# Patient Record
Sex: FEMALE | Race: WHITE | Employment: OTHER | URBAN - METROPOLITAN AREA
[De-identification: names, ages, dates, MRNs, and addresses within clinical notes are randomized per-mention and may not be internally consistent; named-entity substitution may affect disease eponyms.]

---

## 2017-02-24 ENCOUNTER — TELEPHONE (OUTPATIENT)
Dept: FAMILY MEDICINE CLINIC | Age: 47
End: 2017-02-24

## 2017-02-24 NOTE — TELEPHONE ENCOUNTER
----- Message from Cliff Palacios sent at 2/23/2017  5:20 PM EST -----  Regarding: Dr. Jed Robertson   Pt would like a call back in regards to having to re establish as a pt. Pt would like to speak to the doctor directly. Pt best contact number is [phone] or [phone].

## 2017-03-01 ENCOUNTER — OFFICE VISIT (OUTPATIENT)
Dept: FAMILY MEDICINE CLINIC | Age: 47
End: 2017-03-01

## 2017-03-01 VITALS
HEIGHT: 67 IN | DIASTOLIC BLOOD PRESSURE: 87 MMHG | RESPIRATION RATE: 20 BRPM | HEART RATE: 88 BPM | SYSTOLIC BLOOD PRESSURE: 126 MMHG | WEIGHT: 193 LBS | BODY MASS INDEX: 30.29 KG/M2 | OXYGEN SATURATION: 96 % | TEMPERATURE: 98.4 F

## 2017-03-01 DIAGNOSIS — Z23 ENCOUNTER FOR IMMUNIZATION: ICD-10-CM

## 2017-03-01 DIAGNOSIS — J06.9 UPPER RESPIRATORY TRACT INFECTION, UNSPECIFIED TYPE: Primary | ICD-10-CM

## 2017-03-01 DIAGNOSIS — Z91.09 ENVIRONMENTAL ALLERGIES: ICD-10-CM

## 2017-03-01 RX ORDER — AZITHROMYCIN 250 MG/1
TABLET, FILM COATED ORAL
Qty: 6 TAB | Refills: 1 | Status: SHIPPED | OUTPATIENT
Start: 2017-03-01 | End: 2017-03-06

## 2017-03-01 NOTE — MR AVS SNAPSHOT
Visit Information Date & Time Provider Department Dept. Phone Encounter #  
 3/1/2017  2:00 PM García Viera MD [facility] [phone] [id number] Upcoming Health Maintenance Date Due Pneumococcal 19-64 Medium Risk (1 of 1 - PPSV23) 11/7/1987 PAP AKA CERVICAL CYTOLOGY 11/1/2014 INFLUENZA AGE 9 TO ADULT 8/1/2016 DTaP/Tdap/Td series (2 - Td) 2/27/2022 Allergies as of 3/1/2017  Review Complete On: 3/1/2017 By: García Viera MD  
  
 Severity Noted Reaction Type Reactions Morphine  04/16/2013    Itching Current Immunizations  Reviewed on 3/1/2017 Name Date Influenza Vaccine 10/11/2012 TD Vaccine 7/6/2006 TDAP Vaccine 2/27/2012 Reviewed by Sofy Amador on 3/1/2017 at  2:23 PM  
You Were Diagnosed With   
  
 Codes Comments Environmental allergies    -  Primary ICD-10-CM: Z91.09 
ICD-9-CM: V15.09 Upper respiratory tract infection, unspecified type     ICD-10-CM: J06.9 ICD-9-CM: 465.9 Vitals BP  
  
  
  
  
  
 126/87 BMI and BSA Data Body Mass Index Body Surface Area  
 30.23 kg/m 2 2.03 m 2 Preferred Pharmacy Pharmacy Name Phone [facility] [address] [phone] Your Updated Medication List  
  
   
This list is accurate as of: 3/1/17  2:39 PM.  Always use your most recent med list.  
  
  
  
  
 azithromycin 250 mg tablet Commonly known as:  Sinan Del Valle Take 2 tablets today, then take 1 tablet daily  
  
 fluticasone 50 mcg/actuation nasal spray Commonly known as:  Marleen Jamison 2 Sprays by Both Nostrils route daily. Administer to right and left nostril. ZyrTEC 10 mg Cap Generic drug:  Cetirizine Take  by mouth. Prescriptions Sent to Pharmacy Refills  
 azithromycin (ZITHROMAX) 250 mg tablet 1 Sig: Take 2 tablets today, then take 1 tablet daily  Class: Normal  
 Pharmacy: [place], [place] - [address] [address] [facility] #: [phone] Patient Instructions Gargle with warm salt water Take:   over the counter tylenol as directed for pain or fever Use OTC Mucinex for cough Try Zyrtec 10 mg daily for allergy symptoms. Consider over the counter nasacort steroid nasal spray Consider trying the \"NetiPot\" which is a salt water nasal flush if you have nasal congestion:  It really helps! Make sure you use distilled water to make the saline solution Introducing [facility] SERVICES! Angela Wtats introduces [email or web address] patient portal. Now you can access parts of your medical record, email your doctor's office, and request medication refills online. 1. In your internet browser, go to [email or web address]. [email or web address] 2. Click on the First Time User? Click Here link in the Sign In box. You will see the New Member Sign Up page. 3. Enter your [email or web address] Access Code exactly as it appears below. You will not need to use this code after youve completed the sign-up process. If you do not sign up before the expiration date, you must request a new code. · [email or web address] Access Code: [email or web address] Expires: 5/30/2017  2:39 PM 
 
4. Enter the last four digits of your Social Security Number (xxxx) and Date of Birth (mm/dd/yyyy) as indicated and click Submit. You will be taken to the next sign-up page. 5. Create a [email or web address] ID. This will be your [email or web address] login ID and cannot be changed, so think of one that is secure and easy to remember. 6. Create a [email or web address] password. You can change your password at any time. 7. Enter your Password Reset Question and Answer. This can be used at a later time if you forget your password. 8. Enter your e-mail address. You will receive e-mail notification when new information is available in [address] 9. Click Sign Up. You can now view and download portions of your medical record. 10. Click the Download Summary menu link to download a portable copy of your medical information. If you have questions, please visit the Frequently Asked Questions section of the [employer] website. Remember, [employer] is NOT to be used for urgent needs. For medical emergencies, dial 911. Now available from your iPhone and Android! Please provide this summary of care documentation to your next provider. Your primary care clinician is listed as Landen Rod. If you have any questions after today's visit, please call [phone].

## 2017-03-01 NOTE — LETTER
3/1/2017 2:41 PM 
 
Ms. Alissa Garcia [address] [address] Focus on regular exercise (150 minutes each week) and healthy eating. Eat more fruits and vegetables. Eat more protein (egg whites, beans, and nuts you know you tolerate) and less carbohydrates (white bread, white rice, white pasta, white potatoes, sodas, and sweets). Eat appropriately small portion sizes. Keep up with female exams Sincerely, Joy Godinez MD

## 2017-03-01 NOTE — PROGRESS NOTES
Chief Complaint   Patient presents with    Pressure Behind the Eyes     x 1 week; subjective \"low grade\" temp    Headache     Reviewed record in preparation for visit and have obtained necessary documentation. 1. Have you been to the ER, urgent care clinic since your last visit? Hospitalized since your last visit? No    2. Have you seen or consulted any other health care providers outside of the [address] [place] [address] since your last visit? Include any pap smears or colon screening.  No

## 2017-03-01 NOTE — PROGRESS NOTES
April S Genia Dooley is a 48 y.o. female      Issues discussed today include:      Signs and symptoms:  Cough producing yellow sputum  Duration: one week  Context:  +exposures  Location:  Head and chest  Quality:  congestion  Severity:  Preventing rest  Timing:  now  Modifying factors:  No fevers. Was started on amox at [facility]      Data reviewed or ordered today:  Need pneumovax    Other problems include:  Patient Active Problem List   Diagnosis Code    Migraine G43.909    History of normal resting EKG [id number]    Overweight(278.02)     Smoker F17.200       Medications:  Current Outpatient Prescriptions   Medication Sig Dispense Refill    azithromycin (ZITHROMAX) 250 mg tablet Take 2 tablets today, then take 1 tablet daily 6 Tab 1    fluticasone (FLONASE) 50 mcg/actuation nasal spray 2 Sprays by Both Nostrils route daily. Administer to right and left nostril. 1 Bottle 2    Cetirizine (ZYRTEC) 10 mg cap Take  by mouth. Allergies: Allergies   Allergen Reactions    Morphine Itching       LMP:  No LMP recorded. Patient is not currently having periods (Reason: Menopause). Social History     Social History    Marital status:      Spouse name: N/A    Number of children: N/A    Years of education: N/A     Occupational History    Not on file. Social History Main Topics    Smoking status: Former Smoker     Packs/day: 0.25    Smokeless tobacco: Never Used    Alcohol use No    Drug use: No      Comment: 1/2 pack a daY    Sexual activity: Not Currently     Other Topics Concern    Not on file     Social History Narrative         Family History   Problem Relation Age of Onset    Cancer Father     Bleeding Prob Maternal Grandfather      aneurysm         Meaningful use:  done      ROS:  Headaches:  no  Chest Pain:  no  SOB:  no  Fevers:  no  Other significant ROS:      No LMP recorded. Patient is not currently having periods (Reason: Menopause).     Physical Exam  Visit Vitals    /87    Pulse 88    Temp 98.4 °F (36.9 °C) (Oral)    Resp 20    Ht 5' 7\" (1.702 m)    Wt 193 lb (87.5 kg)    SpO2 96%    BMI 30.23 kg/m2     BP Readings from Last 3 Encounters:   03/01/17 126/87   07/09/13 (!) 137/91   05/13/13 128/84     Constitutional:  Appears well,  No Acute Distress, Vitals noted  Psychiatric:   Affect normal, Alert and cooperative, Oriented to person/place/time    Eyes:   Pupils equally round and reactive, EOMI, conjunctiva clear, eyelids normal  ENT:   External ears and nose normal/lips, teeth=OK/gums normal, TMs and Orophyarynx normal  Neck:   general inspection and Thyroid normal.  No abnormal cervical or supraclavicular nodes    Lungs:   clear to auscultation, good respiratory effort  Heart: Ausculation normal.  Regular rhythm. No cardiac murmurs. No carotid bruits or palpable thrills  Chest wall normal  Abd:  benign  Extremities:   without edema, good peripheral pulses  Skin:   Warm to palpation, without rashes, bruising, or suspicious lesions           Assessment:    Patient Active Problem List   Diagnosis Code    Migraine G43.909    History of normal resting EKG [id number]    Overweight(278.02)     Smoker F17.200       Today's diagnoses are:    ICD-10-CM ICD-9-CM    1. Upper respiratory tract infection, unspecified type J06.9 465.9    2. Environmental allergies Z91.09 V15.09    3. Encounter for immunization Z23 V03.89 PNEUMOCOCCAL POLYSACCHARIDE VACCINE, 23-VALENT, ADULT OR IMMUNOSUPPRESSED PT DOSE,       Plan:  Orders Placed This Encounter    PNEUMOCOCCAL POLYSACCHARIDE VACCINE, 23-VALENT, ADULT OR IMMUNOSUPPRESSED PT DOSE,    azithromycin (ZITHROMAX) 250 mg tablet     Sig: Take 2 tablets today, then take 1 tablet daily     Dispense:  6 Tab     Refill:  1       See patient instructions  Patient Instructions   Gargle with warm salt water    Take:   over the counter tylenol as directed for pain or fever    Use OTC Mucinex for cough    Try Zyrtec 10 mg daily for allergy symptoms.   Consider over the counter nasacort steroid nasal spray    Consider trying the \"NetiPot\" which is a salt water nasal flush if you have nasal congestion:  It really helps!   Make sure you use distilled water to make the saline solution              refresh note:  done    AVS Printed:  done

## 2017-08-16 ENCOUNTER — TELEPHONE (OUTPATIENT)
Dept: FAMILY MEDICINE CLINIC | Age: 47
End: 2017-08-16

## 2017-08-16 NOTE — TELEPHONE ENCOUNTER
Pt needs a referral for her dermatologist Dr. Felipa Black. The office's phone is [phone] and the fax is [phone]. She has an [facility] insurance plan that requires a referral from her PCP. She has some cancerous spots that need to be removed by the dermatologist. She can be reached at [phone].

## 2017-08-17 DIAGNOSIS — L98.9 SKIN LESION: Primary | ICD-10-CM

## 2017-08-17 NOTE — TELEPHONE ENCOUNTER
Pt needs a referral for her dermatologist Dr. Nirmal Villanueva. The office's phone is [phone] and the fax is [phone]. She has an [facility] insurance plan that requires a referral from her PCP. She has some cancerous spots that need to be removed by the dermatologist. She can be reached at [phone]. Referral done    Please call Constanza to help arrange and authorize any tests and/or referrals.   Her # is [phone]

## 2017-08-18 NOTE — TELEPHONE ENCOUNTER
Patient was seen on 8/17/2017 and next appointment September 13, 2017 at 12 noon with dermatologist.

## 2017-08-23 ENCOUNTER — OFFICE VISIT (OUTPATIENT)
Dept: FAMILY MEDICINE CLINIC | Age: 47
End: 2017-08-23

## 2017-08-23 VITALS
TEMPERATURE: 98.4 F | WEIGHT: 180 LBS | HEART RATE: 90 BPM | RESPIRATION RATE: 18 BRPM | DIASTOLIC BLOOD PRESSURE: 89 MMHG | HEIGHT: 67 IN | BODY MASS INDEX: 28.25 KG/M2 | OXYGEN SATURATION: 98 % | SYSTOLIC BLOOD PRESSURE: 127 MMHG

## 2017-08-23 DIAGNOSIS — L98.9 SKIN LESION OF FACE: ICD-10-CM

## 2017-08-23 DIAGNOSIS — R05.9 COUGH: ICD-10-CM

## 2017-08-23 DIAGNOSIS — F17.200 SMOKER: ICD-10-CM

## 2017-08-23 DIAGNOSIS — Z13.31 SCREENING FOR DEPRESSION: ICD-10-CM

## 2017-08-23 DIAGNOSIS — J06.9 UPPER RESPIRATORY TRACT INFECTION, UNSPECIFIED TYPE: Primary | ICD-10-CM

## 2017-08-23 RX ORDER — AZITHROMYCIN 250 MG/1
TABLET, FILM COATED ORAL
Qty: 6 TAB | Refills: 0 | Status: SHIPPED | OUTPATIENT
Start: 2017-08-23 | End: 2017-08-28

## 2017-08-23 RX ORDER — BENZONATATE 200 MG/1
200 CAPSULE ORAL
Qty: 21 CAP | Refills: 0 | Status: SHIPPED | OUTPATIENT
Start: 2017-08-23 | End: 2017-08-30

## 2017-08-23 NOTE — PROGRESS NOTES
Alissa Castro is a 48 y.o. female      Issues discussed today include:    Signs and symptoms:  Cough producing yellow sputum  Duration: one week  Context:  +exposures to pneumonia  Location:  Head and chest  Quality:  congestion  Severity:  Preventing rest  Timing:  now  Modifying factors:  No fevers. +smoker (we discussed this 5 minutes)    Data reviewed or ordered today: I have reviewed/discussed the above normal BMI with the patient. I have recommended the following interventions: encourage exercise . Jamilah Smith Other problems include:  Patient Active Problem List   Diagnosis Code    Migraine G43.909    Smoker F17.200    BMI 28.0-28.9,adult Z68.28    Skin lesion of face L98.9       Medications:  Current Outpatient Prescriptions   Medication Sig Dispense Refill    azithromycin (ZITHROMAX) 250 mg tablet Take 2 tablets today, then take 1 tablet daily 6 Tab 0    benzonatate (TESSALON) 200 mg capsule Take 1 Cap by mouth three (3) times daily as needed for Cough for up to 7 days. 21 Cap 0    fluticasone (FLONASE) 50 mcg/actuation nasal spray 2 Sprays by Both Nostrils route daily. Administer to right and left nostril. 1 Bottle 2    Cetirizine (ZYRTEC) 10 mg cap Take  by mouth. Allergies: Allergies   Allergen Reactions    Morphine Itching       LMP:  No LMP recorded. Patient is not currently having periods (Reason: Menopause). Social History     Social History    Marital status:      Spouse name: N/A    Number of children: N/A    Years of education: N/A     Occupational History    Not on file.      Social History Main Topics    Smoking status: Former Smoker     Packs/day: 0.25    Smokeless tobacco: Never Used    Alcohol use No    Drug use: No      Comment: 1/2 pack a daY    Sexual activity: Not Currently     Other Topics Concern    Not on file     Social History Narrative         Family History   Problem Relation Age of Onset    Cancer Father     Bleeding Prob Maternal Grandfather      aneurysm         Meaningful use:  done      ROS:  Headaches:  no  Chest Pain:  no  SOB:  no  Fevers:  no  Falls:  no  anxiety/depression/losing interest in doing things that were previously enjoyed:  no. PHQ2 = 0  Other significant ROS:      We discussed health maintenance/diet/exercise/weight loss    We discussed smoking cessation      No LMP recorded. Patient is not currently having periods (Reason: Menopause). Physical Exam  Visit Vitals    /89    Pulse 90    Temp 98.4 °F (36.9 °C) (Oral)    Resp 18    Ht 5' 7\" (1.702 m)    Wt 180 lb (81.6 kg)    SpO2 98%    BMI 28.19 kg/m2     BP Readings from Last 3 Encounters:   08/23/17 127/89   03/01/17 126/87   07/09/13 (!) 137/91     Constitutional:  Appears well,  No Acute Distress, Vitals noted  Psychiatric:   Affect normal, Alert and cooperative, Oriented to person/place/time    Eyes:   Pupils equally round and reactive, EOMI, conjunctiva clear, eyelids normal  ENT:   External ears and nose normal/lips, teeth=OK/gums normal, TMs and Orophyarynx normal  Neck:   general inspection and Thyroid normal.  No abnormal cervical or supraclavicular nodes    Lungs:   Some rhonchi to auscultation, good respiratory effort  Heart: Ausculation normal.  Regular rhythm. No cardiac murmurs. No carotid bruits or palpable thrills  Chest wall normal  Abd:  benign  Extremities:   without edema, good peripheral pulses  Skin:   Warm to palpation, without rashes, bruising, or suspicious lesions           Assessment:    Patient Active Problem List   Diagnosis Code    Migraine G43.909    Smoker F17.200    BMI 28.0-28.9,adult Z68.28    Skin lesion of face L98.9       Today's diagnoses are:    ICD-10-CM ICD-9-CM    1. Upper respiratory tract infection, unspecified type J06.9 465.9 azithromycin (ZITHROMAX) 250 mg tablet   2. Cough R05 786.2 benzonatate (TESSALON) 200 mg capsule   3. BMI 28.0-28.9,adult Z68.28 V85.24     letter   4.  Skin lesion of face L98.9 709.9    5. Smoker F17.200 305.1     we discussed cessation 5 minutes       Plan:  Orders Placed This Encounter    azithromycin (ZITHROMAX) 250 mg tablet     Sig: Take 2 tablets today, then take 1 tablet daily     Dispense:  6 Tab     Refill:  0    benzonatate (TESSALON) 200 mg capsule     Sig: Take 1 Cap by mouth three (3) times daily as needed for Cough for up to 7 days. Dispense:  21 Cap     Refill:  0       See patient instructions  Patient Instructions   Gargle with warm salt water    Take:   over the counter tylenol as directed for pain or fever    Use OTC Mucinex for cough    Consider over the counter nasacort steroid nasal spray    Consider trying the \"NetiPot\" which is a salt water nasal flush if you have nasal congestion:  It really helps!   Make sure you use distilled water to make the saline solution      Avoid tobacco products            refresh note:  done    AVS Printed:  done

## 2017-08-23 NOTE — PATIENT INSTRUCTIONS
Gargle with warm salt water    Take:   over the counter tylenol as directed for pain or fever    Use OTC Mucinex for cough    Consider over the counter nasacort steroid nasal spray    Consider trying the \"NetiPot\" which is a salt water nasal flush if you have nasal congestion:  It really helps!   Make sure you use distilled water to make the saline solution      Avoid tobacco products

## 2017-08-23 NOTE — LETTER
8/23/2017 9:12 AM 
 
Ms. Alissa Garcia [address] [address] Body mass index is 28.19 kg/(m^2). Focus on regular exercise (150 minutes each week) and healthy eating. Eat more fruits and vegetables. Eat more protein (egg whites, beans, and nuts you know you tolerate) and less carbohydrates (white bread, white rice, white pasta, white potatoes, sodas, and sweets). Eat appropriately small portion sizes. Sincerely, Bennie Bucio MD

## 2017-08-23 NOTE — MR AVS SNAPSHOT
Visit Information Date & Time Provider Department Dept. Phone Encounter #  
 8/23/2017  8:45 AM Kori Thrasher MD [address] [facility] [phone] [id number] Your Appointments 9/20/2017  2:45 PM  
COMPLETE PHYSICAL with Kori Thrasher MD  
[address] [facility]) Appt Note: cpe  
 [address] [place] [address] [address] [place] [phone]  
  
   
 [address] [place] [address] 3 [facility] [address] Upcoming Health Maintenance Date Due INFLUENZA AGE 9 TO ADULT 8/23/2018* PAP AKA CERVICAL CYTOLOGY 11/1/2019 DTaP/Tdap/Td series (2 - Td) 2/27/2022 *Topic was postponed. The date shown is not the original due date. Allergies as of 8/23/2017  Review Complete On: 8/23/2017 By: Kori Thrasher MD  
  
 Severity Noted Reaction Type Reactions Morphine  04/16/2013    Itching Current Immunizations  Reviewed on 3/1/2017 Name Date Influenza Vaccine 11/1/2016, 10/11/2012 Pneumococcal Polysaccharide (PPSV-23) 3/1/2017 TD Vaccine 7/6/2006 TDAP Vaccine 2/27/2012 Not reviewed this visit You Were Diagnosed With   
  
 Codes Comments Upper respiratory tract infection, unspecified type    -  Primary ICD-10-CM: J06.9 ICD-9-CM: 465.9 Cough     ICD-10-CM: R05 ICD-9-CM: 041. 2 Vitals BP Pulse Temp Resp Height(growth percentile) Weight(growth percentile) 127/89 90 98.4 °F (36.9 °C) (Oral) 18 5' 7\" (1.702 m) 180 lb (81.6 kg) SpO2 BMI OB Status Smoking Status 98% 28.19 kg/m2 Menopause Former Smoker Vitals History BMI and BSA Data Body Mass Index Body Surface Area  
 28.19 kg/m 2 1.96 m 2 Preferred Pharmacy Pharmacy Name Phone [facility] [address] [place] [phone] Your Updated Medication List  
  
   
This list is accurate as of: 8/23/17  9:12 AM.  Always use your most recent med list.  
  
  
  
  
 azithromycin 250 mg tablet Commonly known as:  Monty Gent Take 2 tablets today, then take 1 tablet daily  
  
 benzonatate 200 mg capsule Commonly known as:  TESSALON Take 1 Cap by mouth three (3) times daily as needed for Cough for up to 7 days. fluticasone 50 mcg/actuation nasal spray Commonly known as:  Coni Coffee 2 Sprays by Both Nostrils route daily. Administer to right and left nostril. ZyrTEC 10 mg Cap Generic drug:  Cetirizine Take  by mouth. Prescriptions Sent to Pharmacy Refills  
 azithromycin (ZITHROMAX) 250 mg tablet 0 Sig: Take 2 tablets today, then take 1 tablet daily Class: Normal  
 Pharmacy: [address] Ph #: [phone]  
 benzonatate (TESSALON) 200 mg capsule 0 Sig: Take 1 Cap by mouth three (3) times daily as needed for Cough for up to 7 days. Class: Normal  
 Pharmacy: [place] [address] Ph #: [phone] Route: Oral  
  
Patient Instructions Gargle with warm salt water Take:   over the counter tylenol as directed for pain or fever Use OTC Mucinex for cough Consider over the counter nasacort steroid nasal spray Consider trying the \"NetiPot\" which is a salt water nasal flush if you have nasal congestion:  It really helps! Make sure you use distilled water to make the saline solution Avoid tobacco products Introducing [facility] SERVICES! Cb Saenz introduces [employer] patient portal. Now you can access parts of your medical record, email your doctor's office, and request medication refills online. 1. In your internet browser, go to [email or web address] 2. Click on the First Time User? Click Here link in the Sign In box. You will see the New Member Sign Up page. 3. Enter your [employer] Access Code exactly as it appears below.  You will not need to use this code after youve completed the sign-up process. If you do not sign up before the expiration date, you must request a new code. · [employer] Access Code: [email or web address] Expires: 11/21/2017  9:12 AM 
 
4. Enter the last four digits of your Social Security Number (xxxx) and Date of Birth (mm/dd/yyyy) as indicated and click Submit. You will be taken to the next sign-up page. 5. Create a [employer] ID. This will be your [employer] login ID and cannot be changed, so think of one that is secure and easy to remember. 6. Create a [employer] password. You can change your password at any time. 7. Enter your Password Reset Question and Answer. This can be used at a later time if you forget your password. 8. Enter your e-mail address. You will receive e-mail notification when new information is available in [address] 9. Click Sign Up. You can now view and download portions of your medical record. 10. Click the Download Summary menu link to download a portable copy of your medical information. If you have questions, please visit the Frequently Asked Questions section of the [employer] website. Remember, [employer] is NOT to be used for urgent needs. For medical emergencies, dial 911. Now available from your iPhone and Android! Please provide this summary of care documentation to your next provider. Your primary care clinician is listed as Marshall Kellogg. If you have any questions after today's visit, please call [phone].

## 2017-08-30 ENCOUNTER — OFFICE VISIT (OUTPATIENT)
Dept: FAMILY MEDICINE CLINIC | Age: 47
End: 2017-08-30

## 2017-08-30 VITALS
HEIGHT: 67 IN | TEMPERATURE: 98.7 F | WEIGHT: 181 LBS | OXYGEN SATURATION: 98 % | DIASTOLIC BLOOD PRESSURE: 82 MMHG | HEART RATE: 80 BPM | BODY MASS INDEX: 28.41 KG/M2 | RESPIRATION RATE: 18 BRPM | SYSTOLIC BLOOD PRESSURE: 117 MMHG

## 2017-08-30 DIAGNOSIS — J18.9 PNEUMONITIS: Primary | ICD-10-CM

## 2017-08-30 DIAGNOSIS — Z23 ENCOUNTER FOR IMMUNIZATION: ICD-10-CM

## 2017-08-30 RX ORDER — DOXYCYCLINE 100 MG/1
100 CAPSULE ORAL DAILY
Qty: 10 CAP | Refills: 0 | Status: SHIPPED | OUTPATIENT
Start: 2017-08-30 | End: 2017-09-09

## 2017-08-30 RX ORDER — CEFTRIAXONE 1 G/1
1 INJECTION, POWDER, FOR SOLUTION INTRAMUSCULAR; INTRAVENOUS ONCE
Qty: 1 VIAL | Refills: 0
Start: 2017-08-30 | End: 2017-08-30

## 2017-08-30 NOTE — PATIENT INSTRUCTIONS
Gargle with warm salt water    Take:   over the counter tylenol as directed for pain or fever    Use OTC Mucinex for cough    Consider over the counter nasacort steroid nasal spray    Consider trying the \"NetiPot\" which is a salt water nasal flush if you have nasal congestion:  It really helps! Make sure you use distilled water to make the saline solution      Avoid tobacco prodcusts    Take Doxycycline with food and a big glass of water.   Avoid the sun while on Doxycycline (either cover up or wear SPF 15 or above sun block)

## 2017-08-30 NOTE — PROGRESS NOTES
Alissa Iraheta is a 48 y.o. female      Issues discussed today include:        Signs and symptoms:  Cough persisting. See previous notes  Duration:  Over one Week now  Context:  She has been exposed to pneumonia  Location:  Crackles left base  Quality:  Sounds like pneumonia  Severity:  No fevers  Timing:  Cough bad at night  Modifying factors:  +smoker    Data reviewed or ordered today:  CXR    Other problems include:  Patient Active Problem List   Diagnosis Code    Migraine G43.909    Smoker F17.200    BMI 28.0-28.9,adult Z68.28    Skin lesion of face L98.9       Medications:  Current Outpatient Prescriptions   Medication Sig Dispense Refill    cefTRIAXone (ROCEPHIN) 1 gram injection 1 g by IntraMUSCular route once for 1 dose. 1 Vial 0    doxycycline (VIBRAMYCIN) 100 mg capsule Take 1 Cap by mouth daily for 10 days. 10 Cap 0    Cetirizine (ZYRTEC) 10 mg cap Take  by mouth.  benzonatate (TESSALON) 200 mg capsule Take 1 Cap by mouth three (3) times daily as needed for Cough for up to 7 days. 21 Cap 0    fluticasone (FLONASE) 50 mcg/actuation nasal spray 2 Sprays by Both Nostrils route daily. Administer to right and left nostril. 1 Bottle 2       Allergies: Allergies   Allergen Reactions    Morphine Itching       LMP:  No LMP recorded. Patient is not currently having periods (Reason: Menopause). Social History     Social History    Marital status:      Spouse name: N/A    Number of children: N/A    Years of education: N/A     Occupational History    Not on file.      Social History Main Topics    Smoking status: Former Smoker     Packs/day: 0.25    Smokeless tobacco: Never Used    Alcohol use No    Drug use: No      Comment: 1/2 pack a daY    Sexual activity: Not Currently     Other Topics Concern    Not on file     Social History Narrative         Family History   Problem Relation Age of Onset    Cancer Father     Bleeding Prob Maternal Grandfather      aneurysm Meaningful use:  done      ROS:  Headaches:  no  Chest Pain:  no  SOB:  no  Fevers:  no  Other significant ROS:      No LMP recorded. Patient is not currently having periods (Reason: Menopause). Physical Exam  Visit Vitals    /82    Pulse 80    Temp 98.7 °F (37.1 °C) (Oral)    Resp 18    Ht 5' 7\" (1.702 m)    Wt 181 lb (82.1 kg)    SpO2 98%    BMI 28.35 kg/m2     BP Readings from Last 3 Encounters:   08/30/17 117/82   08/23/17 127/89   03/01/17 126/87     Constitutional:  Appears well,  No Acute Distress, Vitals noted  Psychiatric:   Affect normal, Alert and cooperative, Oriented to person/place/time    Eyes:   Pupils equally round and reactive, EOMI, conjunctiva clear, eyelids normal  ENT:   External ears and nose normal/lips, teeth=OK/gums normal, TMs and Orophyarynx normal  Neck:   general inspection and Thyroid normal.  No abnormal cervical or supraclavicular nodes    Lungs:  crackles in left base to auscultation, good respiratory effort  Heart: Ausculation normal.  Regular rhythm. No cardiac murmurs. No carotid bruits or palpable thrills  Chest wall normal  Abd:  benign  Extremities:   without edema, good peripheral pulses  Skin:   Warm to palpation, without rashes, bruising, or suspicious lesions           Assessment:    Patient Active Problem List   Diagnosis Code    Migraine G43.909    Smoker F17.200    BMI 28.0-28.9,adult Z68.28    Skin lesion of face L98.9       Today's diagnoses are:    ICD-10-CM ICD-9-CM    1. Pneumonitis J18.9 486 XR CHEST PA LAT      cefTRIAXone (ROCEPHIN) 1 gram injection      CEFTRIAXONE SODIUM INJECTION  MG      UT THER/PROPH/DIAG INJECTION, SUBCUT/IM      doxycycline (VIBRAMYCIN) 100 mg capsule   2.  Encounter for immunization Z23 V03.89        Plan:  Orders Placed This Encounter    XR CHEST PA LAT     Standing Status:   Future     Standing Expiration Date:   9/30/2018     Order Specific Question:   Reason for Exam     Answer:   cough Order Specific Question:   Is Patient Pregnant? Answer:   Unknown    CEFTRIAXONE SODIUM INJECTION  MG (Qty 4 for 1 gm)     Mix per protocol     Order Specific Question:   Charge Quantity? Answer:   4    THER/PROPH/DIAG INJECTION, SUBCUT/IM    cefTRIAXone (ROCEPHIN) 1 gram injection     Si g by IntraMUSCular route once for 1 dose. Dispense:  1 Vial     Refill:  0    doxycycline (VIBRAMYCIN) 100 mg capsule     Sig: Take 1 Cap by mouth daily for 10 days. Dispense:  10 Cap     Refill:  0     Take with food       See patient instructions  Patient Instructions   Gargle with warm salt water    Take:   over the counter tylenol as directed for pain or fever    Use OTC Mucinex for cough    Consider over the counter nasacort steroid nasal spray    Consider trying the \"NetiPot\" which is a salt water nasal flush if you have nasal congestion:  It really helps! Make sure you use distilled water to make the saline solution      Avoid tobacco prodcusts    Take Doxycycline with food and a big glass of water.   Avoid the sun while on Doxycycline (either cover up or wear SPF 15 or above sun block)              refresh note:  done    AVS Printed:  done

## 2017-08-30 NOTE — MR AVS SNAPSHOT
Visit Information Date & Time Provider Department Dept. Phone Encounter #  
 8/30/2017  3:30 PM Pili Lo MD [address] [facility] [phone] [id number] Your Appointments 9/20/2017  2:45 PM  
COMPLETE PHYSICAL with Pili Lo MD  
[address] [facility] [address] [place] [address]) Appt Note: cpe  
 [address] [facility]  
[phone]  
  
   
 [address] [facility] [phone] Upcoming Health Maintenance Date Due INFLUENZA AGE 9 TO ADULT 8/23/2018* PAP AKA CERVICAL CYTOLOGY 11/1/2019 DTaP/Tdap/Td series (2 - Td) 2/27/2022 *Topic was postponed. The date shown is not the original due date. Allergies as of 8/30/2017  Review Complete On: 8/30/2017 By: Pili Lo MD  
  
 Severity Noted Reaction Type Reactions Morphine  04/16/2013    Itching Current Immunizations  Reviewed on 3/1/2017 Name Date Influenza Vaccine 11/1/2016, 10/11/2012 Pneumococcal Polysaccharide (PPSV-23) 3/1/2017 TD Vaccine 7/6/2006 TDAP Vaccine 2/27/2012 Not reviewed this visit You Were Diagnosed With   
  
 Codes Comments Pneumonitis    -  Primary ICD-10-CM: J18.9 ICD-9-CM: 322 Encounter for immunization     ICD-10-CM: I50 ICD-9-CM: V03.89 Vitals BP Pulse Temp Resp Height(growth percentile) Weight(growth percentile) 117/82 80 98.7 °F (37.1 °C) (Oral) 18 5' 7\" (1.702 m) 181 lb (82.1 kg) SpO2 BMI OB Status Smoking Status 98% 28.35 kg/m2 Menopause Former Smoker Vitals History BMI and BSA Data Body Mass Index Body Surface Area  
 28.35 kg/m 2 1.97 m 2 Preferred Pharmacy Pharmacy Name Phone [facility], [address] [phone] Your Updated Medication List  
  
   
This list is accurate as of: 8/30/17  3:58 PM.  Always use your most recent med list.  
  
  
  
  
 benzonatate 200 mg capsule Commonly known as:  TESSALON Take 1 Cap by mouth three (3) times daily as needed for Cough for up to 7 days. cefTRIAXone 1 gram injection Commonly known as:  ROCEPHIN  
1 g by IntraMUSCular route once for 1 dose. doxycycline 100 mg capsule Commonly known as:  VIBRAMYCIN Take 1 Cap by mouth daily for 10 days. fluticasone 50 mcg/actuation nasal spray Commonly known as:  Marine Knack 2 Sprays by Both Nostrils route daily. Administer to right and left nostril. ZyrTEC 10 mg Cap Generic drug:  Cetirizine Take  by mouth. Prescriptions Sent to Pharmacy Refills  
 doxycycline (VIBRAMYCIN) 100 mg capsule 0 Sig: Take 1 Cap by mouth daily for 10 days. Class: Normal  
 Pharmacy: [place], [place] - [address] [place] [address] Leon Elisa  #: [phone] Route: Oral  
  
We Performed the Following CEFTRIAXONE SODIUM INJECTION  MG [ [facility] [place] Comments:  
 Mix per protocol [place] THER/PROPH/DIAG INJECTION, SUBCUT/IM [id number] CPT(R)] To-Do List   
 08/30/2017 Imaging:  XR CHEST PA LAT Patient Instructions Gargle with warm salt water Take:   over the counter tylenol as directed for pain or fever Use OTC Mucinex for cough Consider over the counter nasacort steroid nasal spray Consider trying the \"NetiPot\" which is a salt water nasal flush if you have nasal congestion:  It really helps! Make sure you use distilled water to make the saline solution Avoid tobacco prodcusts Take Doxycycline with food and a big glass of water. Avoid the sun while on Doxycycline (either cover up or wear SPF 15 or above sun block) Introducing [facility]! [facility] introduces [employer] patient portal. Now you can access parts of your medical record, email your doctor's office, and request medication refills online. 1. In your internet browser, go to [email or web address] 2. Click on the First Time User? Click Here link in the Sign In box. You will see the New Member Sign Up page. 3. Enter your [facility] Access Code exactly as it appears below. You will not need to use this code after youve completed the sign-up process. If you do not sign up before the expiration date, you must request a new code. · [employer] Access Code: [email or web address] Expires: 11/21/2017  9:12 AM 
 
4. Enter the last four digits of your Social Security Number (xxxx) and Date of Birth (mm/dd/yyyy) as indicated and click Submit. You will be taken to the next sign-up page. 5. Create a [facility] ID. This will be your [facility] login ID and cannot be changed, so think of one that is secure and easy to remember. 6. Create a [employer] password. You can change your password at any time. 7. Enter your Password Reset Question and Answer. This can be used at a later time if you forget your password. 8. Enter your e-mail address. You will receive e-mail notification when new information is available in [address] 9. Click Sign Up. You can now view and download portions of your medical record. 10. Click the Download Summary menu link to download a portable copy of your medical information. If you have questions, please visit the Frequently Asked Questions section of the [employer] website. Remember, [employer] is NOT to be used for urgent needs. For medical emergencies, dial 911. Now available from your iPhone and Android! Please provide this summary of care documentation to your next provider. Your primary care clinician is listed as Abigail Roblero. If you have any questions after today's visit, please call [phone].

## 2018-02-08 ENCOUNTER — TELEPHONE (OUTPATIENT)
Dept: FAMILY MEDICINE CLINIC | Age: 48
End: 2018-02-08

## 2018-02-08 NOTE — TELEPHONE ENCOUNTER
patient calling in advising she needs a referral for Dermatology and I advised her she has [employer] insurance and we do not accept this insurance and she would have to find a new pcp and obtain a referral from them patient is upset about this and disconnected the line after I explained to her she would have to become self pay if she would like to stay at this practice, thank you.

## 2018-03-23 ENCOUNTER — TELEPHONE (OUTPATIENT)
Dept: FAMILY MEDICINE CLINIC | Age: 48
End: 2018-03-23

## 2018-03-23 NOTE — TELEPHONE ENCOUNTER
----- Message from Leatha Warren sent at 3/23/2018  4:09 PM EDT -----  Regarding: Dr. Ailyn Vega  Pt is requesting a call back. No further information was disclosed.  P [phone]

## 2018-03-26 NOTE — TELEPHONE ENCOUNTER
Spoke with Ms. Chari Gaucher, states she wants to speak with Dr. Katie Elizabeth, not a nurse. Informed Ms. Chari Gaucher that a message would be sent to Dr. Katie Elizabeth.